# Patient Record
Sex: FEMALE | URBAN - NONMETROPOLITAN AREA
[De-identification: names, ages, dates, MRNs, and addresses within clinical notes are randomized per-mention and may not be internally consistent; named-entity substitution may affect disease eponyms.]

---

## 2020-12-10 ENCOUNTER — APPOINTMENT (RX ONLY)
Dept: URBAN - NONMETROPOLITAN AREA CLINIC 13 | Facility: CLINIC | Age: 50
Setting detail: DERMATOLOGY
End: 2020-12-10

## 2020-12-10 DIAGNOSIS — Z41.9 ENCOUNTER FOR PROCEDURE FOR PURPOSES OTHER THAN REMEDYING HEALTH STATE, UNSPECIFIED: ICD-10-CM

## 2020-12-10 PROCEDURE — ? JUVEDERM ULTRA INJECTION

## 2020-12-10 NOTE — PROCEDURE: JUVEDERM ULTRA INJECTION
Additional Area 3 Location: lower face  rhytides
Additional Area 5 Location: lips, upper
Additional Area 1 Location: corner of mouth
Procedural Text: The filler was administered to the treatment areas noted above.
Number Of Syringes (Required For Inventory): 1
Temple Hollows Filler Volume In Cc: 0
Anesthesia Volume In Cc: 0.5
Map Statement: See Attach Map for Complete Details
Expiration Date (Month Year): 07/21
Consent: Written consent obtained. Risks include but not limited to bruising, beading, irregular texture, ulceration, infection, allergic reaction, scar formation, incomplete augmentation, temporary nature, procedural pain.
Detail Level: Detailed
Include Cannula Information In Note?: No
Additional Area 4 Location: L mlower cheek touch up
Additional Area 2 Location: ear lobes
Post-Care Instructions: Patient instructed to apply ice to reduce swelling.
Filler: Juvederm Ultra
Anesthesia Type: 1% lidocaine with epinephrine
Lot #: [id number]
Additional Anesthesia Volume In Cc: 6
Price (Use Numbers Only, No Special Characters Or $): 684

## 2021-03-04 ENCOUNTER — APPOINTMENT (RX ONLY)
Dept: URBAN - NONMETROPOLITAN AREA CLINIC 13 | Facility: CLINIC | Age: 51
Setting detail: DERMATOLOGY
End: 2021-03-04

## 2021-03-04 DIAGNOSIS — Z41.9 ENCOUNTER FOR PROCEDURE FOR PURPOSES OTHER THAN REMEDYING HEALTH STATE, UNSPECIFIED: ICD-10-CM

## 2021-03-04 PROCEDURE — ? JUVEDERM ULTRA INJECTION

## 2021-03-04 NOTE — PROCEDURE: JUVEDERM ULTRA INJECTION
Additional Area 2 Volume In Cc: 0
Filler: Juvederm Ultra
Procedural Text: The filler was administered to the treatment areas noted above.
Anesthesia Type: 1% lidocaine with epinephrine
Vermilion Lips Filler Volume In Cc: 0.6
Additional Area 5 Location: lips, upper
Additional Area 1 Location: corner of mouth
Detail Level: Detailed
Post-Care Instructions: Patient instructed to apply ice to reduce swelling.
Consent: Written consent obtained. Risks include but not limited to bruising, beading, irregular texture, ulceration, infection, allergic reaction, scar formation, incomplete augmentation, temporary nature, procedural pain.
Expiration Date (Month Year): 11/21
Additional Anesthesia Volume In Cc: 6
Include Cannula Information In Note?: No
Additional Area 3 Location: lower face  rhytides
Additional Area 3 Volume In Cc: 0.3
Number Of Syringes (Required For Inventory): 1
Map Statement: See Attach Map for Complete Details
Anesthesia Volume In Cc: 0.5
Additional Area 4 Location: chin touch up
Price (Use Numbers Only, No Special Characters Or $): 745
Lot #: [id number]
Additional Area 2 Location: lower lip

## 2021-07-13 ENCOUNTER — APPOINTMENT (RX ONLY)
Dept: URBAN - NONMETROPOLITAN AREA CLINIC 13 | Facility: CLINIC | Age: 51
Setting detail: DERMATOLOGY
End: 2021-07-13

## 2021-07-13 DIAGNOSIS — Z41.9 ENCOUNTER FOR PROCEDURE FOR PURPOSES OTHER THAN REMEDYING HEALTH STATE, UNSPECIFIED: ICD-10-CM

## 2021-07-13 PROCEDURE — ? JUVEDERM ULTRA INJECTION

## 2021-07-13 NOTE — PROCEDURE: JUVEDERM ULTRA INJECTION
Mid Face Filler Volume In Cc: 0
Nasolabial Folds Filler Volume In Cc: 0.4
Detail Level: Detailed
Additional Anesthesia Volume In Cc: 6
Post-Care Instructions: Patient instructed to apply ice to reduce swelling.
Use Map Statement For Sites (Optional): No
Vermilion Lips Filler Volume In Cc: 0.6
Additional Area 3 Location: lower face  rhytides
Additional Area 1 Location: corner of mouth
Expiration Date (Month Year): 4/22
Anesthesia Volume In Cc: 0.5
Additional Area 5 Location: lips, upper
Number Of Syringes (Required For Inventory): 1
Procedural Text: The filler was administered to the treatment areas noted above.
Price (Use Numbers Only, No Special Characters Or $): 457
Additional Area 2 Location: lower lip
Consent: Written consent obtained. Risks include but not limited to bruising, beading, irregular texture, ulceration, infection, allergic reaction, scar formation, incomplete augmentation, temporary nature, procedural pain.
Lot #: [id number]
Anesthesia Type: 1% lidocaine with epinephrine
Additional Area 4 Location: chin
Map Statement: See Attach Map for Complete Details
Filler: Juvederm Ultra

## 2022-11-17 ENCOUNTER — APPOINTMENT (RX ONLY)
Dept: URBAN - NONMETROPOLITAN AREA CLINIC 13 | Facility: CLINIC | Age: 52
Setting detail: DERMATOLOGY
End: 2022-11-17

## 2022-11-17 DIAGNOSIS — Z41.9 ENCOUNTER FOR PROCEDURE FOR PURPOSES OTHER THAN REMEDYING HEALTH STATE, UNSPECIFIED: ICD-10-CM

## 2022-11-17 PROCEDURE — ? JUVEDERM ULTRA INJECTION

## 2022-11-17 PROCEDURE — ? ADDITIONAL NOTES

## 2022-11-17 NOTE — PROCEDURE: JUVEDERM ULTRA INJECTION
Tear Troughs Filler Volume In Cc: 0
Additional Area 4 Location: upper vermillion
Filler: Juvederm Ultra
Procedural Text: The filler was administered to the treatment areas noted above.
Detail Level: Detailed
Nasolabial Folds Filler Volume In Cc: 0.3
Anesthesia Type: 1% lidocaine with epinephrine
Use Map Statement For Sites (Optional): No
Consent: Written consent obtained. Risks include but not limited to bruising, beading, irregular texture, ulceration, infection, allergic reaction, scar formation, incomplete augmentation, temporary nature, procedural pain.
Additional Area 5 Location: rubina oral rhytides
Additional Anesthesia Volume In Cc: 6
Expiration Date (Month Year): 8/23
Additional Area 3 Location: lower face  rhytides
Number Of Syringes (Required For Inventory): 1
Additional Area 1 Location: lips
Post-Care Instructions: Patient instructed to apply ice to reduce swelling.
Anesthesia Volume In Cc: 0.5
Price (Use Numbers Only, No Special Characters Or $): 234
Lot #: [id number]
Map Statement: See Attach Map for Complete Details
Marionette Lines Filler Volume In Cc: 0.4

## 2022-11-17 NOTE — PROCEDURE: ADDITIONAL NOTES
Render Risk Assessment In Note?: no
Additional Notes: Shared part of syringe with daughter.
Detail Level: Simple

## 2023-05-31 ENCOUNTER — APPOINTMENT (RX ONLY)
Dept: URBAN - NONMETROPOLITAN AREA CLINIC 13 | Facility: CLINIC | Age: 53
Setting detail: DERMATOLOGY
End: 2023-05-31

## 2023-05-31 DIAGNOSIS — Z41.9 ENCOUNTER FOR PROCEDURE FOR PURPOSES OTHER THAN REMEDYING HEALTH STATE, UNSPECIFIED: ICD-10-CM

## 2023-05-31 PROCEDURE — ? BOTOX

## 2023-05-31 PROCEDURE — ? JUVEDERM VOLUMA XC INJECTION

## 2023-05-31 NOTE — PROCEDURE: BOTOX
Right Periorbital Skin Units: 0
Forehead Units: 3
Show Additional Area 5: Yes
Dilution (U/0.1 Cc): 1
Additional Area 4 Location: under lower lash lines
Consent: Written consent obtained. Risks include but not limited to lid/brow ptosis, bruising, swelling, diplopia, temporary effect, incomplete chemical denervation.
Additional Area 6 Location: brow
Glabellar Complex Units: 15
Show Ucl Units: No
Post-Care Instructions: Patient instructed to not lie down for 4 hours and limit physical activity for 24 hours. Patient instructed not to travel by airplane for 48 hours.
Additional Area 6 Units: 5
Additional Area 3 Location: chin
Additional Area 5 Location: [facility]
Patient Specific Comments (Will Not Stick From Patient To Patient): Professional sample used.
Additional Area 2 Location: lip upper
Incrementing Botox Units: By 0.5 Units
Expiration Date (Month Year): 12/25
Detail Level: Zone
Lot #:

## 2023-05-31 NOTE — PROCEDURE: JUVEDERM VOLUMA XC INJECTION
Detail Level: Detailed
Dorsal Hands Filler Volume In Cc: 0
Consent: Written consent obtained. Risks include but not limited to bruising, beading, irregular texture, ulceration, infection, allergic reaction, scar formation, incomplete augmentation, temporary nature, procedural pain.
Include Cannula Size?: 25G
Use Map Statement For Sites (Optional): No
Additional Area 3 Location: Left cheek touch up
Expiration Date (Month Year): 1/24
Additional Anesthesia Volume In Cc: 0.2
Post-Care Instructions: Patient instructed to apply ice to reduce swelling. Call with any concerns.
Additional Area 1 Location: chin, jawline
Number Of Syringes (Required For Inventory): 1
Anesthesia Volume In Cc: 0.5
Include Cannula Length?: 1.5 inch
Lot #: [id number]
Cheeks Filler Volume In Cc: 2
Additional Anesthesia Type: 0.1% lidocaine with 1:1,000,000 epinephrine (tumescent anesthesia)
Additional Area 4 Location: lateral tear troughs
Price (Use Numbers Only, No Special Characters Or $): 1830
Map Statment: See Attach Map for Complete Details
Additional Area 2 Location: chin
Procedural Text: The filler was administered to the treatment areas noted above. .Lateral face was injected with cannula.\\n.
Filler: Juvederm Voluma XC

## 2023-06-28 ENCOUNTER — APPOINTMENT (RX ONLY)
Dept: URBAN - NONMETROPOLITAN AREA CLINIC 13 | Facility: CLINIC | Age: 53
Setting detail: DERMATOLOGY
End: 2023-06-28

## 2023-06-28 DIAGNOSIS — Z41.9 ENCOUNTER FOR PROCEDURE FOR PURPOSES OTHER THAN REMEDYING HEALTH STATE, UNSPECIFIED: ICD-10-CM

## 2023-06-28 PROCEDURE — ? ADDITIONAL NOTES

## 2023-06-28 PROCEDURE — ? JUVEDERM ULTRA INJECTION

## 2023-06-28 NOTE — PROCEDURE: JUVEDERM ULTRA INJECTION
Brows Filler Volume In Cc: 0
Consent: Written consent obtained. Risks include but not limited to bruising, beading, irregular texture, ulceration, infection, allergic reaction, scar formation, incomplete augmentation, temporary nature, procedural pain.
Include Cannula Information In Note?: No
Filler: Juvederm Ultra
Price (Use Numbers Only, No Special Characters Or $): 522
Map Statement: See Attach Map for Complete Details
Additional Area 4 Location: upper vermillion
Lot #: [id number]
Procedural Text: The filler was administered to the treatment areas noted above.
Additional Area 5 Location: rubina oral rhytides
Post-Care Instructions: Patient instructed to apply ice to reduce swelling.
Additional Anesthesia Volume In Cc: 6
Detail Level: Detailed
Number Of Syringes (Required For Inventory): 1
Additional Area 3 Location: lower face  rhytides
Vermilion Lips Filler Volume In Cc: 0.5
Expiration Date (Month Year): 10/23
Additional Area 1 Location: lips

## 2023-06-28 NOTE — PROCEDURE: ADDITIONAL NOTES
Additional Notes: Julisa and her daughter shared a syringe of Juvederm Ultra.
Render Risk Assessment In Note?: no
Detail Level: Simple

## 2023-08-15 ENCOUNTER — APPOINTMENT (RX ONLY)
Dept: URBAN - NONMETROPOLITAN AREA CLINIC 13 | Facility: CLINIC | Age: 53
Setting detail: DERMATOLOGY
End: 2023-08-15

## 2023-08-15 DIAGNOSIS — Z41.9 ENCOUNTER FOR PROCEDURE FOR PURPOSES OTHER THAN REMEDYING HEALTH STATE, UNSPECIFIED: ICD-10-CM

## 2023-08-15 PROCEDURE — ? COSMETIC CONSULTATION: BOTULINUM TOXIN

## 2023-08-15 PROCEDURE — ? ADDITIONAL NOTES

## 2023-08-15 PROCEDURE — ? BOTOX

## 2023-08-15 PROCEDURE — ? MEDICAL CONSULTATION: FILLERS

## 2023-08-15 NOTE — PROCEDURE: BOTOX
Additional Area 4 Units: 0
Show Additional Area 3: Yes
Additional Area 3 Location: chin
Additional Area 2 Location: lip upper
Post-Care Instructions: Patient instructed to not lie down for 4 hours and limit physical activity for 24 hours. Patient instructed not to travel by airplane for 48 hours.
Glabellar Complex Units: 15
Incrementing Botox Units: By 0.5 Units
Show Lcl Units: No
Additional Area 1 Location: brow
Detail Level: Zone
Additional Area 6 Location: crows feet, R side 14u and L side 8 u
Expiration Date (Month Year): 12/25
Additional Area 1 Units: 5
Forehead Units: 3
Additional Area 5 Location: [facility]
Lot #: 
Price (Use Numbers Only, No Special Characters Or $): 855
Additional Area 4 Location: under lower lash lines
Dilution (U/0.1 Cc): 1
Consent: Written consent obtained. Risks include but not limited to lid/brow ptosis, bruising, swelling, diplopia, temporary effect, incomplete chemical denervation.

## 2023-08-15 NOTE — PROCEDURE: ADDITIONAL NOTES
Additional Notes: Dr. Ly was consulted and agreed with treatment plan as per written protocol.
Detail Level: Simple
Render Risk Assessment In Note?: no

## 2023-11-29 ENCOUNTER — APPOINTMENT (RX ONLY)
Dept: URBAN - NONMETROPOLITAN AREA CLINIC 13 | Facility: CLINIC | Age: 53
Setting detail: DERMATOLOGY
End: 2023-11-29

## 2023-11-29 DIAGNOSIS — Z41.9 ENCOUNTER FOR PROCEDURE FOR PURPOSES OTHER THAN REMEDYING HEALTH STATE, UNSPECIFIED: ICD-10-CM

## 2023-11-29 PROCEDURE — ? BOTOX

## 2023-11-29 NOTE — PROCEDURE: BOTOX
Right Periorbital Skin Units: 0
Show Right And Left Brow Units: No
Incrementing Botox Units: By 0.5 Units
Show Levator Superior Units: Yes
Expiration Date (Month Year): 4/26
Additional Area 4 Location: under lower lash line
Additional Area 1 Units: 5
Detail Level: Zone
Price (Use Numbers Only, No Special Characters Or $): 099
Forehead Units: 3
Lot #: 
Additional Area 3 Location: chin
Glabellar Complex Units: 15
Dilution (U/0.1 Cc): 1
Consent: Written consent obtained. Risks include but not limited to lid/brow ptosis, bruising, swelling, diplopia, temporary effect, incomplete chemical denervation.
Additional Area 6 Location: lateral brow 3u L. 2uR
Post-Care Instructions: Patient instructed to not lie down for 4 hours and limit physical activity for 24 hours. Patient instructed not to travel by airplane for 48 hours.
Additional Area 2 Location: lip upper
Additional Area 5 Location: [facility] 3u L , 2u R
Additional Area 1 Location: lateral  brow

## 2024-01-02 ENCOUNTER — APPOINTMENT (RX ONLY)
Dept: URBAN - NONMETROPOLITAN AREA CLINIC 13 | Facility: CLINIC | Age: 54
Setting detail: DERMATOLOGY
End: 2024-01-02

## 2024-01-02 DIAGNOSIS — Z41.9 ENCOUNTER FOR PROCEDURE FOR PURPOSES OTHER THAN REMEDYING HEALTH STATE, UNSPECIFIED: ICD-10-CM

## 2024-01-02 PROCEDURE — ? BOTOX

## 2024-01-02 NOTE — PROCEDURE: BOTOX
Inferior Lateral Orbicularis Oculi Units: 0
Show Additional Area 2: Yes
Incrementing Botox Units: By 0.5 Units
Expiration Date (Month Year): 4/26
Additional Area 2 Location: lip upper
Additional Area 6 Location: lateral brows 2-3
Dilution (U/0.1 Cc): 1
Show Right And Left Pupillary Line Units: No
Detail Level: Zone
Forehead Units: 5
Additional Area 3 Location: chin
Lot #: 
Post-Care Instructions: Patient instructed to not lie down for 4 hours and limit physical activity for 24 hours. Patient instructed not to travel by airplane for 48 hours.
Additional Area 4 Location: under lower lash line
Consent: Written consent obtained. Risks include but not limited to lid/brow ptosis, bruising, swelling, diplopia, temporary effect, incomplete chemical denervation.
Additional Area 1 Location: lateral  brow
Additional Area 5 Location: [facility] 3u L , 2u R
Price (Use Numbers Only, No Special Characters Or $): 10

## 2024-01-10 ENCOUNTER — APPOINTMENT (RX ONLY)
Dept: URBAN - NONMETROPOLITAN AREA CLINIC 13 | Facility: CLINIC | Age: 54
Setting detail: DERMATOLOGY
End: 2024-01-10

## 2024-01-10 DIAGNOSIS — Z41.9 ENCOUNTER FOR PROCEDURE FOR PURPOSES OTHER THAN REMEDYING HEALTH STATE, UNSPECIFIED: ICD-10-CM

## 2024-01-10 PROCEDURE — ? ADDITIONAL NOTES

## 2024-01-10 PROCEDURE — ? JUVEDERM ULTRA INJECTION

## 2024-01-10 NOTE — PROCEDURE: ADDITIONAL NOTES
Detail Level: Simple
Render Risk Assessment In Note?: no
Additional Notes: Shared a syringe with daughter.
no

## 2024-01-10 NOTE — PROCEDURE: JUVEDERM ULTRA INJECTION
Additional Area 5 Location: rubina oral rhytides
Additional Area 1 Location: lips
Additional Area 3 Location: lower face  rhytides
Post-Care Instructions: Patient instructed to apply ice to reduce swelling.
Vermilion Lips Filler Volume In Cc: 0
Expiration Date (Month Year): 8/24
Detail Level: Detailed
Additional Area 1 Volume In Cc: 0.4
Number Of Syringes (Required For Inventory): 1
Filler: Juvederm Ultra
Additional Area 2 Location: Right side of upper lip and lower lip
Procedural Text: The filler was administered to the treatment areas noted above.
Price (Use Numbers Only, No Special Characters Or $): 585
Additional Area 4 Location: upper vermillion
Map Statement: See Attach Map for Complete Details
Lot #: [id number]
Use Map Statement For Sites (Optional): No
Consent: Written consent obtained. Risks include but not limited to bruising, beading, irregular texture, ulceration, infection, allergic reaction, scar formation, incomplete augmentation, temporary nature, procedural pain.

## 2024-03-12 ENCOUNTER — APPOINTMENT (RX ONLY)
Dept: URBAN - NONMETROPOLITAN AREA CLINIC 13 | Facility: CLINIC | Age: 54
Setting detail: DERMATOLOGY
End: 2024-03-12

## 2024-03-12 DIAGNOSIS — Z41.9 ENCOUNTER FOR PROCEDURE FOR PURPOSES OTHER THAN REMEDYING HEALTH STATE, UNSPECIFIED: ICD-10-CM

## 2024-03-12 PROCEDURE — ? BOTOX

## 2024-03-12 NOTE — PROCEDURE: BOTOX
Anterior Platysmal Bands Units: 0
Show Glabellar Units: Yes
Price (Use Numbers Only, No Special Characters Or $): 390
Additional Area 4 Location: under lower lash line
Show Right And Left Brow Units: No
Lot #: 
Consent: Written consent obtained. Risks include but not limited to lid/brow ptosis, bruising, swelling, diplopia, temporary effect, incomplete chemical denervation.
Dilution (U/0.1 Cc): 1
Additional Area 3 Location: chin
Post-Care Instructions: Patient instructed to not lie down for 4 hours and limit physical activity for 24 hours. Patient instructed not to travel by airplane for 48 hours.
Additional Area 1 Location: Lateral  brow
Incrementing Botox Units: By 0.5 Units
Additional Area 6 Location: lateral brows
Additional Area 6 Units: 5
Additional Area 2 Location: lip upper
Detail Level: Zone
Additional Area 5 Location: [facility]
Glabellar Complex Units: 15
Expiration Date (Month Year): 5/26

## 2024-06-04 ENCOUNTER — APPOINTMENT (RX ONLY)
Dept: URBAN - NONMETROPOLITAN AREA CLINIC 13 | Facility: CLINIC | Age: 54
Setting detail: DERMATOLOGY
End: 2024-06-04

## 2024-06-04 DIAGNOSIS — Z41.9 ENCOUNTER FOR PROCEDURE FOR PURPOSES OTHER THAN REMEDYING HEALTH STATE, UNSPECIFIED: ICD-10-CM

## 2024-06-04 PROCEDURE — ? JUVEDERM ULTRA INJECTION

## 2024-06-04 NOTE — PROCEDURE: JUVEDERM ULTRA INJECTION
Vermilion Lips Filler Volume In Cc: 0.5
Use Map Statement For Sites (Optional): No
Detail Level: Detailed
Additional Area 5 Volume In Cc: 0
Number Of Syringes (Required For Inventory): 1
Additional Area 3 Location: lower face  rhytides
Additional Area 1 Location: lips
Procedural Text: The filler was administered to the treatment areas noted above.
Lot #: [id number]
Post-Care Instructions: Patient instructed to apply ice to reduce swelling.
Consent: Written consent obtained. Risks include but not limited to bruising, beading, irregular texture, ulceration, infection, allergic reaction, scar formation, incomplete augmentation, temporary nature, procedural pain.
Additional Area 4 Location: upper vermillion
Additional Area 2 Location: Right side of upper lip and lower lip
Additional Area 5 Location: rubina oral rhytides
Price (Use Numbers Only, No Special Characters Or $): 980
Filler: Juvederm Ultra
Map Statement: See Attach Map for Complete Details
Expiration Date (Month Year): 12/24

## 2025-01-14 ENCOUNTER — APPOINTMENT (OUTPATIENT)
Dept: URBAN - NONMETROPOLITAN AREA CLINIC 13 | Facility: CLINIC | Age: 55
Setting detail: DERMATOLOGY
End: 2025-01-14

## 2025-01-14 DIAGNOSIS — Z41.9 ENCOUNTER FOR PROCEDURE FOR PURPOSES OTHER THAN REMEDYING HEALTH STATE, UNSPECIFIED: ICD-10-CM

## 2025-01-14 PROCEDURE — ? COSMETIC CONSULTATION: BOTULINUM TOXIN

## 2025-01-14 PROCEDURE — ? MEDICAL CONSULTATION: FILLERS

## 2025-01-14 PROCEDURE — ? BOTOX

## 2025-01-14 PROCEDURE — ? ADDITIONAL NOTES

## 2025-01-14 NOTE — PROCEDURE: BOTOX
Glabellar Complex Units: 15
Additional Area 6 Units: 0
Additional Area 5 Location: Gummy smile , R side
Post-Care Instructions: Patient instructed to not lie down for 4 hours and limit physical activity for 24 hours. Patient instructed not to travel by airplane for 48 hours.
Additional Area 1 Units: 5
Show Lateral Platysmal Band Units: Yes
Additional Area 4 Location: under lower lash line
Show Ucl Units: No
Additional Area 3 Location: chin
Expiration Date (Month Year): 3/27
Detail Level: Zone
Price (Use Numbers Only, No Special Characters Or $): 344
Lot #: [id number]
Dilution (U/0.1 Cc): 1
Incrementing Botox Units: By 0.5 Units
Additional Area 2 Location: lip upper
Additional Area 6 Location: Lateral brows
Consent: Written consent obtained. Risks include but not limited to lid/brow ptosis, bruising, swelling, diplopia, temporary effect, incomplete chemical denervation.
Additional Area 1 Location: lateral  brow

## 2025-01-14 NOTE — PROCEDURE: ADDITIONAL NOTES
Detail Level: Simple
Render Risk Assessment In Note?: no
Additional Notes: Carolina Banuelos NP was consulted and she agrees with treatment plan as per written protocol.